# Patient Record
Sex: MALE | Race: BLACK OR AFRICAN AMERICAN
[De-identification: names, ages, dates, MRNs, and addresses within clinical notes are randomized per-mention and may not be internally consistent; named-entity substitution may affect disease eponyms.]

---

## 2020-03-23 ENCOUNTER — HOSPITAL ENCOUNTER (OUTPATIENT)
Dept: HOSPITAL 53 - M RAD | Age: 30
End: 2020-03-23
Attending: INTERNAL MEDICINE
Payer: COMMERCIAL

## 2020-03-23 DIAGNOSIS — Z86.73: Primary | ICD-10-CM

## 2020-03-23 DIAGNOSIS — Q21.1: ICD-10-CM

## 2020-03-23 DIAGNOSIS — Q25.72: ICD-10-CM

## 2020-03-23 NOTE — REP
Clinical:  Congenital pulmonary malformation.

 

Technique:  Axial noncontrast images from the thoracic inlet to the upper abdomen

with coronal and sagittal re-formations.

 

Comparison:  None.

 

Findings:

The bilateral lung fields are well-aerated and clear.  No consolidation, nodule

or mass lesion.  No pleural effusion.  No pneumothorax.  The tracheobronchial

tree is patent and demonstrates normal branching pattern.  Pulmonary vasculature

is grossly normal by noncontrast evaluation.  Thoracic aorta appears normal.  The

heart demonstrates prior surgical intervention at the level of the atrial septum

possibly related to prior septal defect.  Heart size and contour are normal and

without cardiomegaly.  Surrounding musculoskeletal structures are intact.

 

Impression:

1.  No acute process.

 

 

Electronically Signed by

Vern Cummings MD 03/23/2020 08:39 A